# Patient Record
Sex: MALE | Race: WHITE | Employment: FULL TIME | URBAN - METROPOLITAN AREA
[De-identification: names, ages, dates, MRNs, and addresses within clinical notes are randomized per-mention and may not be internally consistent; named-entity substitution may affect disease eponyms.]

---

## 2020-01-13 ENCOUNTER — APPOINTMENT (OUTPATIENT)
Dept: GENERAL RADIOLOGY | Age: 45
End: 2020-01-13
Attending: FAMILY MEDICINE
Payer: COMMERCIAL

## 2020-01-13 ENCOUNTER — HOSPITAL ENCOUNTER (OUTPATIENT)
Age: 45
Discharge: HOME | End: 2020-01-13
Attending: FAMILY MEDICINE
Payer: COMMERCIAL

## 2020-01-13 VITALS
SYSTOLIC BLOOD PRESSURE: 107 MMHG | RESPIRATION RATE: 16 BRPM | OXYGEN SATURATION: 97 % | HEART RATE: 93 BPM | HEIGHT: 73 IN | TEMPERATURE: 100 F | WEIGHT: 205 LBS | DIASTOLIC BLOOD PRESSURE: 85 MMHG | BODY MASS INDEX: 27.17 KG/M2

## 2020-01-13 DIAGNOSIS — J10.1 INFLUENZA A: ICD-10-CM

## 2020-01-13 DIAGNOSIS — J18.9 COMMUNITY ACQUIRED PNEUMONIA OF LEFT LUNG, UNSPECIFIED PART OF LUNG: Primary | ICD-10-CM

## 2020-01-13 LAB
POCT INFLUENZA A: POSITIVE
POCT INFLUENZA B: NEGATIVE

## 2020-01-13 PROCEDURE — 96372 THER/PROPH/DIAG INJ SC/IM: CPT

## 2020-01-13 PROCEDURE — 99204 OFFICE O/P NEW MOD 45 MIN: CPT

## 2020-01-13 PROCEDURE — 71046 X-RAY EXAM CHEST 2 VIEWS: CPT | Performed by: FAMILY MEDICINE

## 2020-01-13 PROCEDURE — 87502 INFLUENZA DNA AMP PROBE: CPT | Performed by: FAMILY MEDICINE

## 2020-01-13 RX ORDER — AZITHROMYCIN 250 MG/1
TABLET, FILM COATED ORAL
Qty: 1 PACKAGE | Refills: 0 | Status: SHIPPED | OUTPATIENT
Start: 2020-01-13 | End: 2020-01-18

## 2020-01-13 RX ORDER — OSELTAMIVIR PHOSPHATE 75 MG/1
75 CAPSULE ORAL 2 TIMES DAILY
Qty: 10 CAPSULE | Refills: 0 | Status: SHIPPED | OUTPATIENT
Start: 2020-01-13 | End: 2020-01-18

## 2020-01-13 NOTE — ED PROVIDER NOTES
Patient Seen in: [id number] [facility]      History   Patient presents with:  Cough/URI  Fever    Stated Complaint: coughing congestion with fever     HPI  This is a 27-year-old gentleman coming in with a productive cough, bringing up a lot of place and time  GAIT: Normal    ED Course     Orders Placed This Encounter      XR CHEST PA + LAT CHEST (CPT=71046) Once          Order Specific Question: What is the Relevant Clinical Indication / Reason for Exam?          Answer: coughing congestion with normal size. No pneumothorax. CONCLUSION:    Subtle lingular pneumonia and small left pleural effusion. Follow-up suggested.    Dictated by: Gabe Rutledge MD on 1/13/2020 at 12:58     Approved by: Gabe Rutledge MD on 1/13/2020 at 13:00          R

## 2022-02-03 PROBLEM — F10.20 ALCOHOL USE DISORDER, MODERATE, DEPENDENCE (HCC): Status: ACTIVE | Noted: 2022-02-03

## 2022-02-03 PROBLEM — F32.2 MDD (MAJOR DEPRESSIVE DISORDER), SINGLE EPISODE, SEVERE (HCC): Status: ACTIVE | Noted: 2022-02-03

## 2022-02-03 NOTE — PROGRESS NOTES
02/03/22 0126   COVID Exposure Risk Screening   Have you been practicing social distancing? Yes   Have you been wearing a mask when in the community? Yes   Are the people you live with following social distancing and wearing a mask?   (lives alone)   While you are work, do you have direct contact with co-workers or others in the community that may increase your risk of exposure? Not currently working  Connye Nageotte is unemployed and hasn't had the Covid vaccine.)   If those you live with work, do they have direct contact with co-workers or others in the community that may increase their risk of exposure?   (lives alone)   Have you traveled or engaged in group activities in the past two weeks? No   Have you been engaging in any high-risk behaviors in the past two weeks that would have led to increased exposure risk? Yes   Describe Braeden Brown was with his son (12) 2 days ago. He was with his other 2 children (15, 17) a week ago. They all attend school remotely and aren't in extracurricular activities. His children are unemployed. Per [facility] supervisor, IS until 2/7/22.
Yes

## 2022-02-03 NOTE — ED NOTES
Dr Winston Freedman accepts to [facility] rm [id number]. RN to RN x [phone].

## 2023-07-26 ENCOUNTER — HOSPITAL ENCOUNTER (OUTPATIENT)
Age: 48
Discharge: HOME | End: 2023-07-26
Payer: COMMERCIAL

## 2023-07-26 VITALS
WEIGHT: 205 LBS | HEIGHT: 73 IN | BODY MASS INDEX: 27.17 KG/M2 | OXYGEN SATURATION: 95 % | HEART RATE: 100 BPM | TEMPERATURE: 97 F | RESPIRATION RATE: 24 BRPM | DIASTOLIC BLOOD PRESSURE: 80 MMHG | SYSTOLIC BLOOD PRESSURE: 117 MMHG

## 2023-07-26 DIAGNOSIS — J01.00 ACUTE NON-RECURRENT MAXILLARY SINUSITIS: Primary | ICD-10-CM

## 2023-07-26 PROCEDURE — 99213 OFFICE O/P EST LOW 20 MIN: CPT | Performed by: NURSE PRACTITIONER

## 2023-07-26 RX ORDER — AMOXICILLIN AND CLAVULANATE POTASSIUM 875; 125 MG/1; MG/1
1 TABLET, FILM COATED ORAL 2 TIMES DAILY
Qty: 14 TABLET | Refills: 0 | Status: SHIPPED | OUTPATIENT
Start: 2023-07-26 | End: 2023-08-02

## 2023-07-26 NOTE — ED INITIAL ASSESSMENT (HPI)
Pt c/o 2 weeks of nasal congestion and headache. Pt also c/o fatigue. Pt states he's had an intermittent fever.

## 2023-07-26 NOTE — DISCHARGE INSTRUCTIONS
Increase water intake 2-3 liters daily   You may also use over-the-counter Flonase or Nasacort 1 squirt into each nare twice a day.